# Patient Record
Sex: MALE | URBAN - METROPOLITAN AREA
[De-identification: names, ages, dates, MRNs, and addresses within clinical notes are randomized per-mention and may not be internally consistent; named-entity substitution may affect disease eponyms.]

---

## 2024-04-15 ENCOUNTER — LAB (OUTPATIENT)
Dept: URBAN - METROPOLITAN AREA CLINIC 40 | Facility: CLINIC | Age: 49
End: 2024-04-15

## 2024-04-15 ENCOUNTER — OV NP (OUTPATIENT)
Dept: URBAN - METROPOLITAN AREA CLINIC 40 | Facility: CLINIC | Age: 49
End: 2024-04-15
Payer: SELF-PAY

## 2024-04-15 VITALS
HEIGHT: 65 IN | SYSTOLIC BLOOD PRESSURE: 136 MMHG | HEART RATE: 82 BPM | DIASTOLIC BLOOD PRESSURE: 86 MMHG | WEIGHT: 278.6 LBS | BODY MASS INDEX: 46.42 KG/M2 | TEMPERATURE: 97.9 F

## 2024-04-15 DIAGNOSIS — Z12.11 COLON CANCER SCREENING: ICD-10-CM

## 2024-04-15 DIAGNOSIS — K62.5 RECTAL BLEEDING: ICD-10-CM

## 2024-04-15 PROCEDURE — 99202 OFFICE O/P NEW SF 15 MIN: CPT | Performed by: NURSE PRACTITIONER

## 2024-04-15 RX ORDER — POLYETHYLENE GLYCOL 3350, SODIUM CHLORIDE, SODIUM BICARBONATE, POTASSIUM CHLORIDE 420; 11.2; 5.72; 1.48 G/4L; G/4L; G/4L; G/4L
AS DIRECTED POWDER, FOR SOLUTION ORAL ONCE
Qty: 4000 | Refills: 0 | OUTPATIENT
Start: 2024-04-15 | End: 2024-04-16

## 2024-04-15 RX ORDER — PANTOPRAZOLE SODIUM 40 MG/1
TAKE ONE TABLET BY MOUTH ONE TIME DAILY TABLET, DELAYED RELEASE ORAL
Qty: 30 UNSPECIFIED | Refills: 0 | Status: ACTIVE | COMMUNITY

## 2024-04-15 RX ORDER — PRAVASTATIN SODIUM 40 MG/1
TAKE ONE TABLET BY MOUTH EVERY NIGHT TABLET ORAL
Qty: 30 UNSPECIFIED | Refills: 0 | Status: ACTIVE | COMMUNITY

## 2024-04-15 RX ORDER — FAMOTIDINE 20 MG/1
TAKE ONE TABLET BY MOUTH TWICE A DAY TABLET ORAL
Qty: 30 UNSPECIFIED | Refills: 1 | Status: ACTIVE | COMMUNITY

## 2024-04-15 RX ORDER — HYDROCORTISONE ACETATE 25 MG/1
INSERT ONE SUPPOSITORY RECTALLY TWICE A DAY SUPPOSITORY RECTAL
Qty: 12 UNSPECIFIED | Refills: 0 | Status: ACTIVE | COMMUNITY

## 2024-04-15 RX ORDER — LISINOPRIL 20 MG/1
TAKE ONE TABLET BY MOUTH ONE TIME DAILY TABLET ORAL
Qty: 30 UNSPECIFIED | Refills: 0 | Status: ACTIVE | COMMUNITY

## 2024-04-15 RX ORDER — METFORMIN HYDROCHLORIDE 500 MG/1
TAKE ONE TABLET BY MOUTH EVERY MORNING WITH FOOD TABLET, EXTENDED RELEASE ORAL
Qty: 30 UNSPECIFIED | Refills: 0 | Status: ACTIVE | COMMUNITY

## 2024-04-15 RX ORDER — POLYETHYLENE GLYCOL 3350 17 G/17G
TAKE 1 PACKET BY MOUTH ONE TIME DAILY MIXED WITH 8 OUNCES OF FLUID POWDER, FOR SOLUTION ORAL
Qty: 10 UNSPECIFIED | Refills: 0 | Status: ACTIVE | COMMUNITY

## 2024-04-15 NOTE — HPI-TODAY'S VISIT:
48-year-old male patient with PMH as listed below. New patient. No referral noted in chart.   -- The patient presents today to schedule colonoscopy. His primary language is Anibal, Joanne Verdugo MA in room to translate. He has never had cololonospy. He had some stomach problems, PCP gave him some meds, said he needed colon. He denies dark stools, no family hx CRC. No dysphagia. His GERD is well controlled with PPI, H2 blocker. 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  Occasiaonal rectal bleeding. Denies constipation. He has been working on weight loss.

## 2024-04-16 PROBLEM — 408512008: Status: ACTIVE | Noted: 2024-04-16

## 2024-06-06 ENCOUNTER — CLAIMS CREATED FROM THE CLAIM WINDOW (OUTPATIENT)
Dept: URBAN - METROPOLITAN AREA SURGERY CENTER 30 | Facility: SURGERY CENTER | Age: 49
End: 2024-06-06
Payer: SELF-PAY

## 2024-06-06 DIAGNOSIS — Z12.11 COLON CANCER SCREENING: ICD-10-CM

## 2024-06-06 DIAGNOSIS — K64.8 EXTERNAL HEMORRHOIDS: ICD-10-CM

## 2024-06-06 DIAGNOSIS — K57.30 ACQUIRED DIVERTICULOSIS OF COLON: ICD-10-CM

## 2024-06-06 DIAGNOSIS — E11.9 ABNORMAL METABOLIC STATE DUE TO DIABETES MELLITUS: ICD-10-CM

## 2024-06-06 PROCEDURE — G8907 PT DOC NO EVENTS ON DISCHARG: HCPCS | Performed by: INTERNAL MEDICINE

## 2024-06-06 PROCEDURE — G0121 COLON CA SCRN NOT HI RSK IND: HCPCS | Performed by: INTERNAL MEDICINE

## 2024-06-06 PROCEDURE — 00812 ANES LWR INTST SCR COLSC: CPT | Performed by: NURSE ANESTHETIST, CERTIFIED REGISTERED

## 2024-06-28 ENCOUNTER — DASHBOARD ENCOUNTERS (OUTPATIENT)
Age: 49
End: 2024-06-28

## 2024-07-01 ENCOUNTER — OFFICE VISIT (OUTPATIENT)
Dept: URBAN - METROPOLITAN AREA CLINIC 40 | Facility: CLINIC | Age: 49
End: 2024-07-01

## 2024-07-01 RX ORDER — LISINOPRIL 20 MG/1
TAKE ONE TABLET BY MOUTH ONE TIME DAILY TABLET ORAL
Qty: 30 UNSPECIFIED | Refills: 0 | Status: ACTIVE | COMMUNITY

## 2024-07-01 RX ORDER — PANTOPRAZOLE SODIUM 40 MG/1
TAKE ONE TABLET BY MOUTH ONE TIME DAILY TABLET, DELAYED RELEASE ORAL
Qty: 30 UNSPECIFIED | Refills: 0 | Status: ACTIVE | COMMUNITY

## 2024-07-01 RX ORDER — FAMOTIDINE 20 MG/1
TAKE ONE TABLET BY MOUTH TWICE A DAY TABLET ORAL
Qty: 30 UNSPECIFIED | Refills: 1 | Status: ACTIVE | COMMUNITY

## 2024-07-01 RX ORDER — POLYETHYLENE GLYCOL 3350 17 G/17G
TAKE 1 PACKET BY MOUTH ONE TIME DAILY MIXED WITH 8 OUNCES OF FLUID POWDER, FOR SOLUTION ORAL
Qty: 10 UNSPECIFIED | Refills: 0 | Status: ACTIVE | COMMUNITY

## 2024-07-01 RX ORDER — PRAVASTATIN SODIUM 40 MG/1
TAKE ONE TABLET BY MOUTH EVERY NIGHT TABLET ORAL
Qty: 30 UNSPECIFIED | Refills: 0 | Status: ACTIVE | COMMUNITY

## 2024-07-01 RX ORDER — HYDROCORTISONE ACETATE 25 MG/1
INSERT ONE SUPPOSITORY RECTALLY TWICE A DAY SUPPOSITORY RECTAL
Qty: 12 UNSPECIFIED | Refills: 0 | Status: ACTIVE | COMMUNITY

## 2024-07-01 RX ORDER — METFORMIN HYDROCHLORIDE 500 MG/1
TAKE ONE TABLET BY MOUTH EVERY MORNING WITH FOOD TABLET, EXTENDED RELEASE ORAL
Qty: 30 UNSPECIFIED | Refills: 0 | Status: ACTIVE | COMMUNITY